# Patient Record
Sex: FEMALE | Race: WHITE | Employment: FULL TIME | URBAN - METROPOLITAN AREA
[De-identification: names, ages, dates, MRNs, and addresses within clinical notes are randomized per-mention and may not be internally consistent; named-entity substitution may affect disease eponyms.]

---

## 2017-04-15 ENCOUNTER — HOSPITAL ENCOUNTER (OUTPATIENT)
Age: 22
Discharge: HOME | End: 2017-04-15
Attending: FAMILY MEDICINE
Payer: COMMERCIAL

## 2017-04-15 VITALS
HEIGHT: 66 IN | TEMPERATURE: 98 F | BODY MASS INDEX: 20.89 KG/M2 | HEART RATE: 89 BPM | OXYGEN SATURATION: 99 % | DIASTOLIC BLOOD PRESSURE: 64 MMHG | WEIGHT: 130 LBS | RESPIRATION RATE: 20 BRPM | SYSTOLIC BLOOD PRESSURE: 107 MMHG

## 2017-04-15 DIAGNOSIS — H65.00 ACUTE SEROUS OTITIS MEDIA, RECURRENCE NOT SPECIFIED, UNSPECIFIED LATERALITY: ICD-10-CM

## 2017-04-15 DIAGNOSIS — J01.90 ACUTE SINUSITIS, RECURRENCE NOT SPECIFIED, UNSPECIFIED LOCATION: Primary | ICD-10-CM

## 2017-04-15 PROCEDURE — 99213 OFFICE O/P EST LOW 20 MIN: CPT

## 2017-04-15 PROCEDURE — 99204 OFFICE O/P NEW MOD 45 MIN: CPT

## 2017-04-15 RX ORDER — AMOXICILLIN AND CLAVULANATE POTASSIUM 875; 125 MG/1; MG/1
1 TABLET, FILM COATED ORAL 2 TIMES DAILY
Qty: 14 TABLET | Refills: 0 | Status: SHIPPED | OUTPATIENT
Start: 2017-04-15 | End: 2017-04-22

## 2017-04-15 NOTE — ED PROVIDER NOTES
Patient presents with:  Cough/URI      HPI:     Jovanny Bowen is a 19year old female who presents for evaluation of a chief complaint of nasal congestion, sinus pain and earfullness. Location: Respiratory System.  Onset of symptoms was gradual starting 4 results found for this or any previous visit (from the past 10 hour(s)). Diagnosis:    ICD-10-CM    1. Acute sinusitis, recurrence not specified, unspecified location J01.90    2.  Acute serous otitis media, recurrence not specified, unspecified laterali

## 2017-04-15 NOTE — ED INITIAL ASSESSMENT (HPI)
Pt c/o runny nose, bilateral \"ear fullness\" and congested cough for the last couple of days. Denies fever at home. Pt has been taking Allegra.

## 2017-06-23 ENCOUNTER — TELEPHONE (OUTPATIENT)
Dept: FAMILY MEDICINE CLINIC | Facility: CLINIC | Age: 22
End: 2017-06-23

## 2017-06-23 NOTE — TELEPHONE ENCOUNTER
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  Pt relayed she has seen a dermatologist for this about 2 years ago and is asking who Dr. Farzana Delgado referred her to at that

## 2017-06-23 NOTE — TELEPHONE ENCOUNTER
Review of record/ofc visit notes from 5/13/15 state only \"call dermatologist\"  Call to pt-advised of this info. sts dermatologist was a male, thru [facility] or [facility], in [place] but does not recall name.    Requests we check with dr Natalie Rocha for who she wou

## 2017-06-25 NOTE — TELEPHONE ENCOUNTER
Please refer patient to Dr. Palak Cabrera his hearing [place]. He she can also try Dr. Lorena Brian.

## 2017-06-26 NOTE — TELEPHONE ENCOUNTER
Call to pt reaches identified voice mail. left vmm req call back to triage nurse today for referral info from dr Germán Hatfield.      Brenden Best M.D./Dermatology  [facility], [address]  Phone: [phone]

## 2017-07-13 ENCOUNTER — TELEPHONE (OUTPATIENT)
Dept: FAMILY MEDICINE CLINIC | Facility: CLINIC | Age: 22
End: 2017-07-13

## 2017-07-13 ENCOUNTER — HOSPITAL ENCOUNTER (OUTPATIENT)
Age: 22
Discharge: HOME | End: 2017-07-13
Payer: COMMERCIAL

## 2017-07-13 VITALS
SYSTOLIC BLOOD PRESSURE: 106 MMHG | WEIGHT: 140 LBS | DIASTOLIC BLOOD PRESSURE: 67 MMHG | OXYGEN SATURATION: 100 % | RESPIRATION RATE: 16 BRPM | HEART RATE: 75 BPM | BODY MASS INDEX: 23 KG/M2 | TEMPERATURE: 98 F

## 2017-07-13 DIAGNOSIS — T36.95XA ANTIBIOTIC-INDUCED YEAST INFECTION: ICD-10-CM

## 2017-07-13 DIAGNOSIS — B37.9 ANTIBIOTIC-INDUCED YEAST INFECTION: ICD-10-CM

## 2017-07-13 DIAGNOSIS — R31.9 URINARY TRACT INFECTION WITH HEMATURIA, SITE UNSPECIFIED: Primary | ICD-10-CM

## 2017-07-13 DIAGNOSIS — N39.0 URINARY TRACT INFECTION WITH HEMATURIA, SITE UNSPECIFIED: Primary | ICD-10-CM

## 2017-07-13 LAB
POCT BILIRUBIN URINE: NEGATIVE
POCT GLUCOSE URINE: NEGATIVE MG/DL
POCT KETONE URINE: NEGATIVE MG/DL
POCT NITRITE URINE: NEGATIVE
POCT PH URINE: 6.5 (ref 5–8)
POCT SPECIFIC GRAVITY URINE: 1.02
POCT URINE COLOR: YELLOW
POCT URINE PREGNANCY: NEGATIVE
POCT UROBILINOGEN URINE: 0.2 MG/DL

## 2017-07-13 PROCEDURE — 81002 URINALYSIS NONAUTO W/O SCOPE: CPT | Performed by: PHYSICIAN ASSISTANT

## 2017-07-13 PROCEDURE — 87147 CULTURE TYPE IMMUNOLOGIC: CPT | Performed by: PHYSICIAN ASSISTANT

## 2017-07-13 PROCEDURE — 99214 OFFICE O/P EST MOD 30 MIN: CPT

## 2017-07-13 PROCEDURE — 87086 URINE CULTURE/COLONY COUNT: CPT | Performed by: PHYSICIAN ASSISTANT

## 2017-07-13 PROCEDURE — 81025 URINE PREGNANCY TEST: CPT | Performed by: PHYSICIAN ASSISTANT

## 2017-07-13 RX ORDER — FLUCONAZOLE 150 MG/1
150 TABLET ORAL ONCE
Qty: 1 TABLET | Refills: 0 | Status: SHIPPED | OUTPATIENT
Start: 2017-07-13 | End: 2017-07-13

## 2017-07-13 RX ORDER — CIPROFLOXACIN 250 MG/1
250 TABLET, FILM COATED ORAL 2 TIMES DAILY
Qty: 6 TABLET | Refills: 0 | Status: SHIPPED | OUTPATIENT
Start: 2017-07-13 | End: 2017-07-16

## 2017-07-13 NOTE — TELEPHONE ENCOUNTER
Per patient has had urinary symptoms x 1 week. Would like to be seen today. Per Brittney العراقي put phone message in to on call doctor. Thank you.

## 2017-07-13 NOTE — ED PROVIDER NOTES
Patient Seen in: [facility]    History   Patient presents with:  Urinary Symptoms (urologic)    Stated Complaint: UTI    HPI    18 yo female here with c/o urinary urgency/frequency for several days.  PT denies abdominal pain, N/V and is noncontributory to the presenting problem, except as indicated as above. Physical Exam   Constitutional: She is oriented to person, place, and time. She appears well-developed and well-nourished. HENT:   Head: Normocephalic and atraumatic.    Ri today.       Disposition and Plan     Clinical Impression:  Urinary tract infection with hematuria, site unspecified  (primary encounter diagnosis)  Antibiotic-induced yeast infection    Disposition:  Discharge    Follow-up:  Jonelle Ren MD  [address]

## 2017-07-13 NOTE — ED INITIAL ASSESSMENT (HPI)
x1 week, pressure felt after voiding. x2 days ago describes \"tickling in stomach. \"  Denies burning. Frequency noted. Denies back.

## 2017-07-14 NOTE — ED NOTES
Pt returned phone called. Pt feels her sx have improved since starting antibiotic yesterday. Pt aware urine cx results are preliminary at this time. Was instructed we will call with final results if a change in antibiotic is needed. No further questions.

## 2017-07-20 RX ORDER — NORETHINDRONE ACETATE AND ETHINYL ESTRADIOL 1MG-20(21)
1 KIT ORAL
Qty: 84 TABLET | Refills: 0 | Status: SHIPPED
Start: 2017-07-20 | End: 2017-08-30

## 2017-07-20 NOTE — TELEPHONE ENCOUNTER
From: Rg Ordonez  Sent: 7/17/2017 12:15 PM CDT  Subject: Medication Renewal Request    Rg Ordonez would like a refill of the following medications:  Norethin Ace-Eth Estrad-FE (MICROGESTIN FE 1/20) 1-20 MG-MCG Oral Tab Sonia Cohen MD]    P

## 2017-08-15 ENCOUNTER — PATIENT MESSAGE (OUTPATIENT)
Dept: FAMILY MEDICINE CLINIC | Facility: CLINIC | Age: 22
End: 2017-08-15

## 2017-08-15 NOTE — TELEPHONE ENCOUNTER
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

## 2017-08-30 ENCOUNTER — OFFICE VISIT (OUTPATIENT)
Dept: FAMILY MEDICINE CLINIC | Facility: CLINIC | Age: 22
End: 2017-08-30

## 2017-08-30 VITALS
DIASTOLIC BLOOD PRESSURE: 72 MMHG | WEIGHT: 142 LBS | HEART RATE: 70 BPM | BODY MASS INDEX: 22.55 KG/M2 | HEIGHT: 66.5 IN | SYSTOLIC BLOOD PRESSURE: 110 MMHG | RESPIRATION RATE: 16 BRPM | TEMPERATURE: 97 F

## 2017-08-30 DIAGNOSIS — Z01.419 CERVICAL SMEAR, AS PART OF ROUTINE GYNECOLOGICAL EXAMINATION: ICD-10-CM

## 2017-08-30 DIAGNOSIS — Z00.00 PHYSICAL EXAM, ANNUAL: Primary | ICD-10-CM

## 2017-08-30 DIAGNOSIS — Z13.89 SCREENING FOR GENITOURINARY CONDITION: ICD-10-CM

## 2017-08-30 DIAGNOSIS — Z00.00 LABORATORY EXAMINATION ORDERED AS PART OF A ROUTINE GENERAL MEDICAL EXAMINATION: ICD-10-CM

## 2017-08-30 PROCEDURE — 99395 PREV VISIT EST AGE 18-39: CPT | Performed by: FAMILY MEDICINE

## 2017-08-30 PROCEDURE — 88175 CYTOPATH C/V AUTO FLUID REDO: CPT | Performed by: FAMILY MEDICINE

## 2017-08-30 PROCEDURE — 90471 IMMUNIZATION ADMIN: CPT | Performed by: FAMILY MEDICINE

## 2017-08-30 PROCEDURE — 87624 HPV HI-RISK TYP POOLED RSLT: CPT | Performed by: FAMILY MEDICINE

## 2017-08-30 PROCEDURE — 90715 TDAP VACCINE 7 YRS/> IM: CPT | Performed by: FAMILY MEDICINE

## 2017-08-30 RX ORDER — NORETHINDRONE ACETATE AND ETHINYL ESTRADIOL 1MG-20(21)
1 KIT ORAL
Qty: 84 TABLET | Refills: 3 | Status: SHIPPED | OUTPATIENT
Start: 2017-08-30 | End: 2018-08-17

## 2017-08-30 NOTE — PATIENT INSTRUCTIONS
Gummy fibers or Metamucil. Keep good hydration. Healthy diet. Stay active.   Do fasting blood work with 10-12 hours fast.

## 2017-08-30 NOTE — PROGRESS NOTES
HPI:   Juanito Ramirez is a 19year old female who presents for a complete physical exam. Symptoms: denies discharge, itching, burning or dysuria. Patient has no complains. She has some constipation.   Goes to the bathroom every 2  to 3 days  Patient had el daily. Disp: 84 tablet Rfl: 0      Past Medical History:   Diagnosis Date   • Murmur     in youth   • Viral warts, unspecified       No past surgical history on file.    Family History   Problem Relation Age of Onset   • High Cholesterol Father    • Diabete tenderness, PAP was done   RECTAL:deffered  MUSCULOSKELETAL: back is not tender,FROM of the back  EXTREMITIES: no cyanosis, clubbing or edema  NEURO: Oriented times three,cranial nerves are intact,motor and sensory are grossly intact    ASSESSMENT AND PLAN

## 2017-09-01 LAB — HPV I/H RISK 1 DNA SPEC QL NAA+PROBE: NEGATIVE

## 2018-01-12 ENCOUNTER — TELEPHONE (OUTPATIENT)
Dept: FAMILY MEDICINE CLINIC | Facility: CLINIC | Age: 23
End: 2018-01-12

## 2018-01-12 NOTE — TELEPHONE ENCOUNTER
Spoke with the patient and she stated that she is without cough, fatigue or SOB. She doesn't have any physical changes or challenges since her last physical in August of 2017. 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

## 2018-01-12 NOTE — TELEPHONE ENCOUNTER
Please ask patient if she has an the cough? Tiredness? Weight loss? Shortness of breath? Nights with? .  Any changes since the physical from 2017?   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

## 2018-06-12 ENCOUNTER — TELEPHONE (OUTPATIENT)
Dept: FAMILY MEDICINE CLINIC | Facility: CLINIC | Age: 23
End: 2018-06-12

## 2018-06-12 NOTE — TELEPHONE ENCOUNTER
New Employment forms to be filled out. PT had physical within the year and was told Dr. Cervantes Estimable let her know if she needed an appointment to fill these out. She would like to be called to schedule or when forms are ready to be picked up.   Forms were placed

## 2018-06-13 ENCOUNTER — TELEPHONE (OUTPATIENT)
Dept: FAMILY MEDICINE CLINIC | Facility: CLINIC | Age: 23
End: 2018-06-13

## 2018-06-13 DIAGNOSIS — Z11.1 SCREENING-PULMONARY TB: Primary | ICD-10-CM

## 2018-06-13 NOTE — TELEPHONE ENCOUNTER
We gave received form for school physical.  Please ask if patient has on the dry cough, shortness of breath, weight loss, night sweats? .  Please ask if we need to do her PPD testing or the school is going to do that for her.   Please also make patient aware

## 2018-06-13 NOTE — TELEPHONE ENCOUNTER
Discussed below with pt. She denies having any of those sx's. Reports she does need PPD through us, she will call back to schedule nurse vs for this after checking her schedule. She is aware we would use last years date of px for the form.

## 2018-06-15 ENCOUNTER — TELEPHONE (OUTPATIENT)
Dept: FAMILY MEDICINE CLINIC | Facility: CLINIC | Age: 23
End: 2018-06-15

## 2018-06-15 NOTE — TELEPHONE ENCOUNTER
Pls see t/c of 6/12 and 6/15 re: same msg. Pt schedule CPE for 7/23 so she can have her school form valid for a year. She would like to keep her PPD appt on 6/27.   Pt states she does not have copy of school forms; only copy is with Dr. Germán Hatfield

## 2018-06-15 NOTE — TELEPHONE ENCOUNTER
Patient did not -I did leave detailed message that she may get PPD done, but if there is a form to clear her for work, MD can not sign until she has had physical which isn't until August. Told to call if any problem with this.

## 2018-06-27 ENCOUNTER — CHARTING TRANS (OUTPATIENT)
Dept: OTHER | Age: 23
End: 2018-06-27

## 2018-06-29 ENCOUNTER — CHARTING TRANS (OUTPATIENT)
Dept: OTHER | Age: 23
End: 2018-06-29

## 2018-08-17 RX ORDER — NORETHINDRONE ACETATE AND ETHINYL ESTRADIOL AND FERROUS FUMARATE 1MG-20(21)
1 KIT ORAL
Qty: 28 TABLET | Refills: 0 | Status: SHIPPED | OUTPATIENT
Start: 2018-08-17 | End: 2018-09-16

## 2018-09-17 RX ORDER — NORETHINDRONE ACETATE AND ETHINYL ESTRADIOL 1MG-20(21)
1 KIT ORAL
Qty: 28 TABLET | Refills: 0 | Status: SHIPPED | OUTPATIENT
Start: 2018-09-17 | End: 2018-10-14

## 2018-09-17 NOTE — TELEPHONE ENCOUNTER
[address] [facility] 1/20 TABLETS 28S  Gynecology Medication Protocol Failed    I have sent a [employer] message to the pt advising that she call and schedule   A physical.    Please see pended medications. Please sign if appropriate.       Thank you

## 2018-09-19 RX ORDER — NORETHINDRONE ACETATE AND ETHINYL ESTRADIOL AND FERROUS FUMARATE 1MG-20(21)
KIT ORAL
Qty: 28 TABLET | Refills: 0 | OUTPATIENT
Start: 2018-09-19

## 2018-10-17 RX ORDER — NORETHINDRONE ACETATE AND ETHINYL ESTRADIOL 1MG-20(21)
1 KIT ORAL
Qty: 28 TABLET | Refills: 0 | Status: SHIPPED | OUTPATIENT
Start: 2018-10-17 | End: 2018-11-14

## 2018-10-31 VITALS
RESPIRATION RATE: 14 BRPM | HEIGHT: 66 IN | WEIGHT: 140 LBS | HEART RATE: 68 BPM | DIASTOLIC BLOOD PRESSURE: 60 MMHG | BODY MASS INDEX: 22.5 KG/M2 | SYSTOLIC BLOOD PRESSURE: 100 MMHG

## 2018-11-14 NOTE — TELEPHONE ENCOUNTER
Pt requesting refill of Norethin Ace-Eth Estrad-FE (JUNEL FE 1/20) 1-20 MG-MCG Oral Tab. Please advise.

## 2018-11-15 RX ORDER — NORETHINDRONE ACETATE AND ETHINYL ESTRADIOL AND FERROUS FUMARATE 1MG-20(21)
KIT ORAL
Qty: 28 TABLET | Refills: 0 | Status: SHIPPED | OUTPATIENT
Start: 2018-11-15 | End: 2018-12-10

## 2018-11-16 RX ORDER — NORETHINDRONE ACETATE AND ETHINYL ESTRADIOL 1MG-20(21)
1 KIT ORAL
Qty: 28 TABLET | Refills: 0 | Status: SHIPPED | OUTPATIENT
Start: 2018-11-16 | End: 2018-12-10

## 2018-12-10 ENCOUNTER — OFFICE VISIT (OUTPATIENT)
Dept: FAMILY MEDICINE CLINIC | Facility: CLINIC | Age: 23
End: 2018-12-10
Payer: COMMERCIAL

## 2018-12-10 VITALS
DIASTOLIC BLOOD PRESSURE: 72 MMHG | HEART RATE: 55 BPM | RESPIRATION RATE: 16 BRPM | HEIGHT: 66.5 IN | BODY MASS INDEX: 22.87 KG/M2 | WEIGHT: 144 LBS | SYSTOLIC BLOOD PRESSURE: 112 MMHG | TEMPERATURE: 98 F

## 2018-12-10 DIAGNOSIS — Z13.89 SCREENING FOR GENITOURINARY CONDITION: ICD-10-CM

## 2018-12-10 DIAGNOSIS — Z00.00 LABORATORY EXAMINATION ORDERED AS PART OF A ROUTINE GENERAL MEDICAL EXAMINATION: ICD-10-CM

## 2018-12-10 DIAGNOSIS — Z00.00 PHYSICAL EXAM, ANNUAL: Primary | ICD-10-CM

## 2018-12-10 PROCEDURE — 99395 PREV VISIT EST AGE 18-39: CPT | Performed by: FAMILY MEDICINE

## 2018-12-10 RX ORDER — NORETHINDRONE ACETATE AND ETHINYL ESTRADIOL 1MG-20(21)
1 KIT ORAL
Qty: 28 TABLET | Refills: 11 | Status: SHIPPED | OUTPATIENT
Start: 2018-12-10 | End: 2019-12-16

## 2018-12-11 NOTE — PROGRESS NOTES
HPI:   Hamilton Rogers is a 20year old female who presents for a complete physical exam. Symptoms: denies discharge, itching, burning or dysuria. Patient has no complains. She has some constipation.   Goes to the bathroom every 2  to 3 days  Patient had el No results found for: GLUCOSE       Current Outpatient Medications:  Norethin Ace-Eth Estrad-FE (JUNEL FE 1/20) 1-20 MG-MCG Oral Tab Take 1 tablet by mouth once daily.  Disp: 28 tablet Rfl: 0      Past Medical History:   Diagnosis Date   • Murmur     in y good BS's,no masses, HSM or tenderness  : deferred.   RECTAL:deffered  MUSCULOSKELETAL: back is not tender,FROM of the back  EXTREMITIES: no cyanosis, clubbing or edema  NEURO: Oriented times three,cranial nerves are intact,motor and sensory are grossly i

## 2018-12-11 NOTE — PATIENT INSTRUCTIONS
Metamucil. Keep good hydration. Healthy diet. Stay active.   Do fasting blood work with 10-12 hours fast.

## 2019-01-10 ENCOUNTER — PATIENT MESSAGE (OUTPATIENT)
Dept: FAMILY MEDICINE CLINIC | Facility: CLINIC | Age: 24
End: 2019-01-10

## 2019-01-30 PROBLEM — N93.8 DUB (DYSFUNCTIONAL UTERINE BLEEDING): Status: ACTIVE | Noted: 2019-01-30

## 2019-01-31 NOTE — TELEPHONE ENCOUNTER
From: Annmarie Ordonez  Sent: 1/30/2019 2:47 PM CST  To: [facility] Clinical Staff  Subject: RE: Prescription Question    ----- Message from [address] [place] [address] [address] Generic sent at 1/30/2019 2:47 PM CST -----    Hi,   Please disregard the previous message.  I called my insurance

## 2019-01-31 NOTE — TELEPHONE ENCOUNTER
Please type the letter for the patient for medical necessity for birth control pills. Patient has history of iron deficiency anemia. She has dysfunctional uterine bleeding with heavy menstrual periods which  were contributing to patient being anemic.   St

## 2019-02-06 NOTE — TELEPHONE ENCOUNTER
Noted. Letter obtained from [employer] and faxed to [phone] as pt requests. Pt notified via [employer]  Copy of faxed info placed in triage faxed info bin.

## 2019-12-16 ENCOUNTER — OFFICE VISIT (OUTPATIENT)
Dept: FAMILY MEDICINE CLINIC | Facility: CLINIC | Age: 24
End: 2019-12-16
Payer: COMMERCIAL

## 2019-12-16 VITALS
RESPIRATION RATE: 16 BRPM | HEART RATE: 57 BPM | OXYGEN SATURATION: 98 % | DIASTOLIC BLOOD PRESSURE: 58 MMHG | HEIGHT: 66.5 IN | SYSTOLIC BLOOD PRESSURE: 102 MMHG | TEMPERATURE: 98 F | WEIGHT: 144 LBS | BODY MASS INDEX: 22.87 KG/M2

## 2019-12-16 DIAGNOSIS — Z13.89 SCREENING FOR GENITOURINARY CONDITION: ICD-10-CM

## 2019-12-16 DIAGNOSIS — Z00.00 PHYSICAL EXAM, ANNUAL: Primary | ICD-10-CM

## 2019-12-16 DIAGNOSIS — Z00.00 LABORATORY EXAMINATION ORDERED AS PART OF A ROUTINE GENERAL MEDICAL EXAMINATION: ICD-10-CM

## 2019-12-16 PROCEDURE — 99395 PREV VISIT EST AGE 18-39: CPT | Performed by: FAMILY MEDICINE

## 2019-12-16 RX ORDER — NORETHINDRONE ACETATE AND ETHINYL ESTRADIOL 1MG-20(21)
1 KIT ORAL
Qty: 28 TABLET | Refills: 11 | Status: SHIPPED | OUTPATIENT
Start: 2019-12-16 | End: 2020-11-10

## 2019-12-17 NOTE — PATIENT INSTRUCTIONS
Healthy diet. Stay active. Return for fasting blood work. Try Metamucil gummy fibers probiotic-  try to regulate bowel movements.

## 2020-11-10 RX ORDER — NORETHINDRONE ACETATE AND ETHINYL ESTRADIOL 1MG-20(21)
KIT ORAL
Qty: 28 TABLET | Refills: 0 | Status: SHIPPED | OUTPATIENT
Start: 2020-11-10 | End: 2020-12-29

## 2020-12-29 ENCOUNTER — TELEPHONE (OUTPATIENT)
Dept: FAMILY MEDICINE CLINIC | Facility: CLINIC | Age: 25
End: 2020-12-29

## 2020-12-29 RX ORDER — NORETHINDRONE ACETATE AND ETHINYL ESTRADIOL 1MG-20(21)
1 KIT ORAL DAILY
Qty: 28 TABLET | Refills: 0 | Status: SHIPPED | OUTPATIENT
Start: 2020-12-29 | End: 2021-01-20

## 2020-12-29 NOTE — TELEPHONE ENCOUNTER
Pt requested refill of her birth control prescription,   Arkatydavonte Nicci ESTRAD-FE 1-20 MG-MCG Oral Tab 28 tablet     [id number] [address], [address] AT THE [facility] OF [address], [phone], [phone]

## 2021-01-20 RX ORDER — NORETHINDRONE ACETATE AND ETHINYL ESTRADIOL 1MG-20(21)
1 KIT ORAL DAILY
Qty: 28 TABLET | Refills: 0 | Status: SHIPPED | OUTPATIENT
Start: 2021-01-20 | End: 2021-02-22

## 2021-01-20 RX ORDER — NORETHINDRONE ACETATE AND ETHINYL ESTRADIOL AND FERROUS FUMARATE 1MG-20(21)
KIT ORAL
Qty: 28 TABLET | Refills: 0 | OUTPATIENT
Start: 2021-01-20

## 2021-01-29 ENCOUNTER — OFFICE VISIT (OUTPATIENT)
Dept: FAMILY MEDICINE CLINIC | Facility: CLINIC | Age: 26
End: 2021-01-29
Payer: COMMERCIAL

## 2021-01-29 VITALS
OXYGEN SATURATION: 100 % | RESPIRATION RATE: 16 BRPM | BODY MASS INDEX: 23.35 KG/M2 | HEART RATE: 73 BPM | WEIGHT: 147 LBS | SYSTOLIC BLOOD PRESSURE: 108 MMHG | HEIGHT: 66.5 IN | TEMPERATURE: 97 F | DIASTOLIC BLOOD PRESSURE: 64 MMHG

## 2021-01-29 DIAGNOSIS — Z00.00 LABORATORY EXAMINATION ORDERED AS PART OF A ROUTINE GENERAL MEDICAL EXAMINATION: ICD-10-CM

## 2021-01-29 DIAGNOSIS — Z12.4 SCREENING FOR MALIGNANT NEOPLASM OF CERVIX: ICD-10-CM

## 2021-01-29 DIAGNOSIS — Z13.89 SCREENING FOR GENITOURINARY CONDITION: ICD-10-CM

## 2021-01-29 DIAGNOSIS — Z00.00 PHYSICAL EXAM, ANNUAL: Primary | ICD-10-CM

## 2021-01-29 PROCEDURE — 3074F SYST BP LT 130 MM HG: CPT | Performed by: FAMILY MEDICINE

## 2021-01-29 PROCEDURE — 3008F BODY MASS INDEX DOCD: CPT | Performed by: FAMILY MEDICINE

## 2021-01-29 PROCEDURE — 99395 PREV VISIT EST AGE 18-39: CPT | Performed by: FAMILY MEDICINE

## 2021-01-29 PROCEDURE — 87624 HPV HI-RISK TYP POOLED RSLT: CPT | Performed by: FAMILY MEDICINE

## 2021-01-29 PROCEDURE — 3078F DIAST BP <80 MM HG: CPT | Performed by: FAMILY MEDICINE

## 2021-01-29 RX ORDER — MOMETASONE FUROATE 1 MG/G
OINTMENT TOPICAL
COMMUNITY
Start: 2020-12-23

## 2021-01-29 RX ORDER — CLINDAMYCIN AND BENZOYL PEROXIDE 10; 50 MG/G; MG/G
GEL TOPICAL
COMMUNITY
Start: 2020-12-23

## 2021-02-03 LAB — HPV I/H RISK 1 DNA SPEC QL NAA+PROBE: NEGATIVE

## 2021-02-22 RX ORDER — NORETHINDRONE ACETATE AND ETHINYL ESTRADIOL 1MG-20(21)
1 KIT ORAL DAILY
Qty: 84 TABLET | Refills: 1 | Status: SHIPPED | OUTPATIENT
Start: 2021-02-22 | End: 2022-01-24

## 2021-02-22 RX ORDER — NORETHINDRONE ACETATE AND ETHINYL ESTRADIOL 1MG-20(21)
1 KIT ORAL DAILY
Qty: 84 TABLET | Refills: 1 | Status: SHIPPED | OUTPATIENT
Start: 2021-02-22 | End: 2021-02-22

## 2022-01-24 RX ORDER — NORETHINDRONE ACETATE AND ETHINYL ESTRADIOL 1MG-20(21)
1 KIT ORAL DAILY
Qty: 28 TABLET | Refills: 1 | Status: SHIPPED | OUTPATIENT
Start: 2022-01-24 | End: 2022-03-28

## 2022-03-25 RX ORDER — NORETHINDRONE ACETATE AND ETHINYL ESTRADIOL 1MG-20(21)
KIT ORAL
Qty: 28 TABLET | Refills: 1 | Status: CANCELLED | OUTPATIENT
Start: 2022-03-25

## 2022-03-28 RX ORDER — NORETHINDRONE ACETATE AND ETHINYL ESTRADIOL 1MG-20(21)
KIT ORAL
Qty: 28 TABLET | Refills: 0 | Status: SHIPPED | OUTPATIENT
Start: 2022-03-28

## 2022-04-05 ENCOUNTER — OFFICE VISIT (OUTPATIENT)
Dept: FAMILY MEDICINE CLINIC | Facility: CLINIC | Age: 27
End: 2022-04-05
Payer: COMMERCIAL

## 2022-04-05 VITALS
HEIGHT: 66.5 IN | WEIGHT: 145 LBS | RESPIRATION RATE: 16 BRPM | SYSTOLIC BLOOD PRESSURE: 102 MMHG | TEMPERATURE: 98 F | HEART RATE: 80 BPM | BODY MASS INDEX: 23.03 KG/M2 | DIASTOLIC BLOOD PRESSURE: 80 MMHG

## 2022-04-05 DIAGNOSIS — Z00.00 LABORATORY EXAMINATION ORDERED AS PART OF A ROUTINE GENERAL MEDICAL EXAMINATION: ICD-10-CM

## 2022-04-05 DIAGNOSIS — R01.1 HEART MURMUR: ICD-10-CM

## 2022-04-05 DIAGNOSIS — Z13.89 SCREENING FOR GENITOURINARY CONDITION: ICD-10-CM

## 2022-04-05 DIAGNOSIS — Z00.00 PHYSICAL EXAM, ANNUAL: Primary | ICD-10-CM

## 2022-04-05 PROCEDURE — 3074F SYST BP LT 130 MM HG: CPT | Performed by: FAMILY MEDICINE

## 2022-04-05 PROCEDURE — 3008F BODY MASS INDEX DOCD: CPT | Performed by: FAMILY MEDICINE

## 2022-04-05 PROCEDURE — 3079F DIAST BP 80-89 MM HG: CPT | Performed by: FAMILY MEDICINE

## 2022-04-05 PROCEDURE — 99395 PREV VISIT EST AGE 18-39: CPT | Performed by: FAMILY MEDICINE

## 2022-04-05 NOTE — PATIENT INSTRUCTIONS
Healthy diet. Stay active. Call [phone] to schedule fasting blood test.  Call [phone] to schedule echo of the heart.

## 2022-04-25 RX ORDER — NORETHINDRONE ACETATE AND ETHINYL ESTRADIOL 1MG-20(21)
KIT ORAL
Qty: 28 TABLET | Refills: 0 | OUTPATIENT
Start: 2022-04-25

## 2022-04-26 NOTE — TELEPHONE ENCOUNTER
Record shows Blisovi Fe 1-20 mg-mcg last ordered 3/28/22 #28 with end date 4/5/22 and note stating reported not taking on 4/5/22. cpx w dr Margareth Thomason 4/5/22- deferred  last pap in record 1/29/21    Call to pt-confirms she is still taking BCP, pap deferred \"because dr said I didn't need one this year\". Advised will discuss BCP refill w dr and update  w outcome. Patient voices understanding/agrees with plan/no further questions. **pt asking why 3/28/22 BCP refill request denied. See above info, med refill pended for review and advise-thanks!

## 2022-04-27 NOTE — TELEPHONE ENCOUNTER
Pt calling for update. She is getting  next week and would like to get this taken care of today. Thank you.

## 2022-04-28 RX ORDER — NORETHINDRONE ACETATE AND ETHINYL ESTRADIOL 1MG-20(21)
1 KIT ORAL DAILY
Qty: 28 TABLET | Refills: 12 | Status: SHIPPED | OUTPATIENT
Start: 2022-04-28 | End: 2023-04-28

## 2022-05-06 ENCOUNTER — TELEPHONE (OUTPATIENT)
Dept: ADMINISTRATIVE | Age: 27
End: 2022-05-06

## 2022-05-24 ENCOUNTER — HOSPITAL ENCOUNTER (OUTPATIENT)
Dept: CV DIAGNOSTICS | Facility: HOSPITAL | Age: 27
Discharge: HOME | End: 2022-05-24
Attending: FAMILY MEDICINE
Payer: COMMERCIAL

## 2022-05-24 DIAGNOSIS — R01.1 HEART MURMUR: ICD-10-CM

## 2022-05-24 PROCEDURE — 93306 TTE W/DOPPLER COMPLETE: CPT | Performed by: FAMILY MEDICINE

## 2022-05-26 ENCOUNTER — HOSPITAL ENCOUNTER (OUTPATIENT)
Age: 27
Discharge: HOME | End: 2022-05-26
Payer: COMMERCIAL

## 2022-05-26 VITALS
HEIGHT: 66 IN | DIASTOLIC BLOOD PRESSURE: 75 MMHG | WEIGHT: 145 LBS | TEMPERATURE: 97 F | BODY MASS INDEX: 23.3 KG/M2 | SYSTOLIC BLOOD PRESSURE: 111 MMHG | OXYGEN SATURATION: 100 % | HEART RATE: 84 BPM | RESPIRATION RATE: 12 BRPM

## 2022-05-26 DIAGNOSIS — R30.0 DYSURIA: Primary | ICD-10-CM

## 2022-05-26 LAB
B-HCG UR QL: NEGATIVE
POCT BILIRUBIN URINE: NEGATIVE
POCT GLUCOSE URINE: NEGATIVE MG/DL
POCT KETONE URINE: NEGATIVE MG/DL
POCT NITRITE URINE: NEGATIVE
POCT PH URINE: 7 (ref 5–8)
POCT PROTEIN URINE: NEGATIVE MG/DL
POCT SPECIFIC GRAVITY URINE: 1.02
POCT URINE CLARITY: CLEAR
POCT URINE COLOR: YELLOW
POCT UROBILINOGEN URINE: 0.2 MG/DL

## 2022-05-26 PROCEDURE — 87086 URINE CULTURE/COLONY COUNT: CPT | Performed by: NURSE PRACTITIONER

## 2022-05-26 PROCEDURE — 81025 URINE PREGNANCY TEST: CPT | Performed by: NURSE PRACTITIONER

## 2022-05-26 PROCEDURE — 81002 URINALYSIS NONAUTO W/O SCOPE: CPT | Performed by: NURSE PRACTITIONER

## 2022-05-26 PROCEDURE — 99203 OFFICE O/P NEW LOW 30 MIN: CPT | Performed by: NURSE PRACTITIONER

## 2022-05-26 RX ORDER — NITROFURANTOIN 25; 75 MG/1; MG/1
100 CAPSULE ORAL 2 TIMES DAILY
Qty: 6 CAPSULE | Refills: 0 | Status: SHIPPED | OUTPATIENT
Start: 2022-05-26 | End: 2022-05-29

## 2022-05-26 RX ORDER — NORETHINDRONE ACETATE AND ETHINYL ESTRADIOL 1MG-20(21)
KIT ORAL
Qty: 28 TABLET | Refills: 12 | OUTPATIENT
Start: 2022-05-26

## 2022-07-14 ENCOUNTER — TELEPHONE (OUTPATIENT)
Dept: FAMILY MEDICINE CLINIC | Facility: CLINIC | Age: 27
End: 2022-07-14

## 2022-07-14 NOTE — TELEPHONE ENCOUNTER
Medical Records Request received from [address] [place] [address] for records from 1/1/2010 - 1/1/2022. Release original sent to [employer] on 7/14/2022. Copy sent to scanning.

## (undated) NOTE — ED AVS SNAPSHOT
[facility]  [address]    [address] [place] [address]    Phone:  [phone]    Fax:  [phone]           Arben Cruz   MRN: [id number]    Department:  THE [facility]   Date of Visit:  [phone] [id number] [facility], [address]  [phone] [address] Juan Alberto    [phone]       To Check ER Wait Times:  TEXT 'Bambi Parker' to [id number]      Click [email or web address] reading, you will be contacted. Please make sure we have your correct phone number before you leave. After you leave, you should follow the attached instructions. I have read and understand the instructions given to me by my caregivers.         24-Hour You can access your MyChart to more actively manage your health care and view more details from this visit by going to [email or web address].   If you've recently had a stay at the Hospital you can access your discharge instructions in [address] by eboni

## (undated) NOTE — LETTER
06/27/17            Anuj Fischer,    We have been unable to reach you by phone.   Dr Kat Araiza reviewed your message from 6/23/17 and recommends you schedule an appointment with one of the following dermatologists:      Virgil Lafleur M.D./Dermatology

## (undated) NOTE — LETTER
Date: 2/4/2019    Patient Name: Vee Ramos          To Whom it may concern: This letter has been written at the patient's request.  The patient has a history of iron deficient anemia.   She has dysfunctional uterine bleeding with heavy menstrual carolyn